# Patient Record
Sex: MALE | URBAN - METROPOLITAN AREA
[De-identification: names, ages, dates, MRNs, and addresses within clinical notes are randomized per-mention and may not be internally consistent; named-entity substitution may affect disease eponyms.]

---

## 2017-01-27 ENCOUNTER — COMMUNICATION - HEALTHEAST (OUTPATIENT)
Dept: SLEEP MEDICINE | Facility: CLINIC | Age: 47
End: 2017-01-27

## 2017-01-27 DIAGNOSIS — G47.00 PERSISTENT INSOMNIA: ICD-10-CM

## 2017-03-23 ENCOUNTER — COMMUNICATION - HEALTHEAST (OUTPATIENT)
Dept: SLEEP MEDICINE | Facility: CLINIC | Age: 47
End: 2017-03-23

## 2017-03-23 DIAGNOSIS — G47.00 PERSISTENT INSOMNIA: ICD-10-CM

## 2017-05-30 ENCOUNTER — COMMUNICATION - HEALTHEAST (OUTPATIENT)
Dept: SLEEP MEDICINE | Facility: CLINIC | Age: 47
End: 2017-05-30

## 2017-05-30 DIAGNOSIS — G47.00 PERSISTENT INSOMNIA: ICD-10-CM

## 2017-07-24 ENCOUNTER — COMMUNICATION - HEALTHEAST (OUTPATIENT)
Dept: SLEEP MEDICINE | Facility: CLINIC | Age: 47
End: 2017-07-24

## 2017-07-24 DIAGNOSIS — G47.00 PERSISTENT INSOMNIA: ICD-10-CM

## 2017-09-13 ENCOUNTER — COMMUNICATION - HEALTHEAST (OUTPATIENT)
Dept: SLEEP MEDICINE | Facility: CLINIC | Age: 47
End: 2017-09-13

## 2017-09-13 DIAGNOSIS — G47.00 PERSISTENT INSOMNIA: ICD-10-CM

## 2017-09-15 ENCOUNTER — COMMUNICATION - HEALTHEAST (OUTPATIENT)
Dept: SLEEP MEDICINE | Facility: CLINIC | Age: 47
End: 2017-09-15

## 2017-09-28 ENCOUNTER — AMBULATORY - HEALTHEAST (OUTPATIENT)
Dept: SLEEP MEDICINE | Facility: CLINIC | Age: 47
End: 2017-09-28

## 2017-11-14 ENCOUNTER — COMMUNICATION - HEALTHEAST (OUTPATIENT)
Dept: SLEEP MEDICINE | Facility: CLINIC | Age: 47
End: 2017-11-14

## 2017-11-14 DIAGNOSIS — G47.00 PERSISTENT INSOMNIA: ICD-10-CM

## 2017-11-27 ENCOUNTER — OFFICE VISIT - HEALTHEAST (OUTPATIENT)
Dept: SLEEP MEDICINE | Facility: CLINIC | Age: 47
End: 2017-11-27

## 2017-11-27 DIAGNOSIS — G47.00 PERSISTENT INSOMNIA: ICD-10-CM

## 2017-11-27 ASSESSMENT — MIFFLIN-ST. JEOR: SCORE: 1624.69

## 2018-06-23 ENCOUNTER — COMMUNICATION - HEALTHEAST (OUTPATIENT)
Dept: SLEEP MEDICINE | Facility: CLINIC | Age: 48
End: 2018-06-23

## 2018-06-23 DIAGNOSIS — G47.00 PERSISTENT INSOMNIA: ICD-10-CM

## 2018-11-26 ENCOUNTER — OFFICE VISIT - HEALTHEAST (OUTPATIENT)
Dept: SLEEP MEDICINE | Facility: CLINIC | Age: 48
End: 2018-11-26

## 2018-11-26 DIAGNOSIS — G47.00 PERSISTENT INSOMNIA: ICD-10-CM

## 2018-11-26 ASSESSMENT — MIFFLIN-ST. JEOR: SCORE: 1602.01

## 2019-05-29 ENCOUNTER — COMMUNICATION - HEALTHEAST (OUTPATIENT)
Dept: SLEEP MEDICINE | Facility: CLINIC | Age: 49
End: 2019-05-29

## 2019-05-29 DIAGNOSIS — G47.00 PERSISTENT INSOMNIA: ICD-10-CM

## 2019-06-19 ENCOUNTER — COMMUNICATION - HEALTHEAST (OUTPATIENT)
Dept: SLEEP MEDICINE | Facility: CLINIC | Age: 49
End: 2019-06-19

## 2019-09-24 ENCOUNTER — OFFICE VISIT - HEALTHEAST (OUTPATIENT)
Dept: SLEEP MEDICINE | Facility: CLINIC | Age: 49
End: 2019-09-24

## 2019-09-24 DIAGNOSIS — G47.00 PERSISTENT INSOMNIA: ICD-10-CM

## 2019-09-24 DIAGNOSIS — G47.8 NON-RESTORATIVE SLEEP: ICD-10-CM

## 2019-09-24 DIAGNOSIS — G47.8 SLEEP DYSFUNCTION WITH SLEEP STAGE DISTURBANCE: ICD-10-CM

## 2019-09-24 ASSESSMENT — MIFFLIN-ST. JEOR: SCORE: 1579.33

## 2019-09-26 ENCOUNTER — RECORDS - HEALTHEAST (OUTPATIENT)
Dept: ADMINISTRATIVE | Facility: OTHER | Age: 49
End: 2019-09-26

## 2019-09-27 ENCOUNTER — AMBULATORY - HEALTHEAST (OUTPATIENT)
Dept: SLEEP MEDICINE | Facility: CLINIC | Age: 49
End: 2019-09-27

## 2019-10-01 ENCOUNTER — COMMUNICATION - HEALTHEAST (OUTPATIENT)
Dept: SLEEP MEDICINE | Facility: CLINIC | Age: 49
End: 2019-10-01

## 2019-10-01 DIAGNOSIS — G47.8 NON-RESTORATIVE SLEEP: ICD-10-CM

## 2019-10-01 DIAGNOSIS — G47.8 SLEEP DYSFUNCTION WITH SLEEP STAGE DISTURBANCE: ICD-10-CM

## 2019-10-01 DIAGNOSIS — G47.34 SLEEP RELATED HYPOXIA: ICD-10-CM

## 2019-11-04 ENCOUNTER — RECORDS - HEALTHEAST (OUTPATIENT)
Dept: SLEEP MEDICINE | Facility: CLINIC | Age: 49
End: 2019-11-04

## 2019-11-04 ENCOUNTER — RECORDS - HEALTHEAST (OUTPATIENT)
Dept: ADMINISTRATIVE | Facility: OTHER | Age: 49
End: 2019-11-04

## 2019-11-04 DIAGNOSIS — G47.8 OTHER SLEEP DISORDERS: ICD-10-CM

## 2019-11-04 DIAGNOSIS — G47.34 IDIOPATHIC SLEEP RELATED NONOBSTRUCTIVE ALVEOLAR HYPOVENTILATION: ICD-10-CM

## 2019-11-12 ENCOUNTER — COMMUNICATION - HEALTHEAST (OUTPATIENT)
Dept: SLEEP MEDICINE | Facility: CLINIC | Age: 49
End: 2019-11-12

## 2019-11-15 ENCOUNTER — COMMUNICATION - HEALTHEAST (OUTPATIENT)
Dept: SLEEP MEDICINE | Facility: CLINIC | Age: 49
End: 2019-11-15

## 2019-11-27 ENCOUNTER — COMMUNICATION - HEALTHEAST (OUTPATIENT)
Dept: SLEEP MEDICINE | Facility: CLINIC | Age: 49
End: 2019-11-27

## 2019-11-27 DIAGNOSIS — G47.00 PERSISTENT INSOMNIA: ICD-10-CM

## 2019-12-13 ENCOUNTER — COMMUNICATION - HEALTHEAST (OUTPATIENT)
Dept: SCHEDULING | Facility: CLINIC | Age: 49
End: 2019-12-13

## 2019-12-16 ENCOUNTER — COMMUNICATION - HEALTHEAST (OUTPATIENT)
Dept: SLEEP MEDICINE | Facility: CLINIC | Age: 49
End: 2019-12-16

## 2020-01-08 ENCOUNTER — COMMUNICATION - HEALTHEAST (OUTPATIENT)
Dept: SLEEP MEDICINE | Facility: CLINIC | Age: 50
End: 2020-01-08

## 2020-01-08 DIAGNOSIS — G47.00 PERSISTENT INSOMNIA: ICD-10-CM

## 2020-01-08 RX ORDER — CLONAZEPAM 0.5 MG/1
TABLET ORAL
Qty: 30 TABLET | Refills: 0 | Status: SHIPPED | OUTPATIENT
Start: 2020-01-08

## 2021-01-28 ENCOUNTER — DOCUMENTATION ONLY (OUTPATIENT)
Dept: SLEEP MEDICINE | Facility: CLINIC | Age: 51
End: 2021-01-28

## 2021-05-29 NOTE — TELEPHONE ENCOUNTER
clonazePAM (KLONOPIN) 1 MG tablet  1 mg, Oral, Bedtime PRN  EditCancel Reorder       Summary: Take 1 tablet (1 mg total) by mouth at bedtime as needed (insomnia)., Starting Mon 11/26/2018, Normal   Dose, Frequency: 1 mg, Bedtime PRN  Start: 11/26/2018  Ord/Sold: 11/26/2018 (O)  Report  Adh:   Taking:   Long-term:   Pharmacy: [facility] #[id number] - [address] Dose History       Patient Sig: Take 1 tablet (1 mg total) by mouth at bedtime as needed (insomnia).       Ordered on: 11/26/2018       Authorized by: MEEK DURAN       Dispense: 60 tablet       Refills: 2 ordered        LOV: 11/26/18

## 2021-05-29 NOTE — TELEPHONE ENCOUNTER
Patricio called my direct extension leaving me a voicemail: he called to ask when he is due for his annual f/u visit. He also was hoping to corollate PCP (physical), and Sleep provider visit.     I called him back today 6/19/2019  at [phone]. No answer. I left him a message letting him know that Dr. Rose is no longer with [facility], and gave him the names of our providers to schedule his November 2019 visit with. I let him know that there are no other providers in our building any longer other then our sleep providers.   I did also let him know that he is on a recall list and someone should be contacting him in August to remind or schedule his f/u appointment.

## 2021-05-31 VITALS — BODY MASS INDEX: 26.07 KG/M2 | WEIGHT: 172 LBS | HEIGHT: 68 IN

## 2021-06-01 NOTE — PROGRESS NOTES
Ordered: Overnight Oximetry Test    Company: [facility] [employer]    Ordering Provider: Gamaliel Nguyen DO    Date: 9/24/2019    Fax: [phone]

## 2021-06-01 NOTE — PROGRESS NOTES
Dear Grecia Hastings Pa-c  [address] [place] [id number]    Thank you for the opportunity to participate in the care of Mr. Patricio Jones.    He is a 48 y.o. male who comes to the clinic for the transfer of care of his sleep maintenance insomnia.  Patient states that he has been suffering from sleep maintenance insomnia for more than 10 years.  Prior to arrival to the sleep clinic 6 years ago, the patient was on a multitude of different sedative-hypnotics including Lunesta, as well as Ambien.  All of which may have worked a little bit but over time, he develops tolerance to the medications.  He has been prescribed clonazepam 1 mg p.o. nightly by the previous sleep providers for more than 3 years.  At this point he feels that his sleep quality is much better compared to before.  There is some concern about dependence this medication and possible increased risk of morbidity/mortality associated with long-term benzodiazepine use.  The patient denies any episodes of witnessed apnea and snoring, he admits that he is kind of a restless sleeper and will frequently toss and turn throughout the night.  The patient's review of systems is otherwise negative.     Ideal Sleep-Wake Cycle(devoid of societal pressure):    Patient would try to initiate sleep at around 11PM with a sleep latency of less than 15 minutes. The patient would have 1 awakening. Final wake up time is around 7AM.    Past Medical History  No past medical history on file.     Past Surgical History  No past surgical history on file.     Meds  Current Outpatient Medications   Medication Sig Dispense Refill     clonazePAM (KLONOPIN) 1 MG tablet Take 1 tablet (1 mg total) by mouth at bedtime as needed (insomnia). 30 tablet 5     No current facility-administered medications for this visit.         Allergies  Patient has no known allergies.     Social History  Social History     Socioeconomic History     Marital status: Single     Spouse name:  Not on file     Number of children: Not on file     Years of education: Not on file     Highest education level: Not on file   Occupational History     Not on file   Social Needs     Financial resource strain: Not on file     Food insecurity:     Worry: Not on file     Inability: Not on file     Transportation needs:     Medical: Not on file     Non-medical: Not on file   Tobacco Use     Smoking status: Never Smoker     Smokeless tobacco: Never Used   Substance and Sexual Activity     Alcohol use: Not on file     Drug use: Not on file     Sexual activity: Not on file   Lifestyle     Physical activity:     Days per week: Not on file     Minutes per session: Not on file     Stress: Not on file   Relationships     Social connections:     Talks on phone: Not on file     Gets together: Not on file     Attends [employer] service: Not on file     Active member of club or organization: Not on file     Attends meetings of clubs or organizations: Not on file     Relationship status: Not on file     Intimate partner violence:     Fear of current or ex partner: Not on file     Emotionally abused: Not on file     Physically abused: Not on file     Forced sexual activity: Not on file   Other Topics Concern     Not on file   Social History Narrative     Not on file        Family History  No family history on file.     Review of Systems:  Constitutional: Negative except as noted in HPI.   Eyes: Negative except as noted in HPI.   ENT: Negative except as noted in HPI.   Cardiovascular: Negative except as noted in HPI.   Respiratory: Negative except as noted in HPI.   Gastrointestinal: Negative except as noted in HPI.   Genitourinary: Negative except as noted in HPI.   Musculoskeletal: Negative except as noted in HPI.   Integumentary: Negative except as noted in HPI.   Neurological: Negative except as noted in HPI.   Psychiatric: Negative except as noted in HPI.   Endocrine: Negative except as noted in HPI.   Hematologic/Lymphatic:  "Negative except as noted in HPI.      No flowsheet data found.  Epworths Sleepiness Scale 8/15/2016 11/27/2017 9/24/2019   Sitting and reading 1 1 1   Watching TV 1 1 1   Sitting, inactive in a public place (e.g. a theatre or a meeting) 0 0 0   As a passenger in a car for an hour without a break 0 0 0   Lying down to rest in the afternoon when circumstances permit 3 2 2   Sitting and talking to someone 0 0 0   Sitting quietly after a lunch without alcohol 0 0 0   In a car, while stopped for a few minutes in traffic 0 0 0   Total score 5 4 4     No flowsheet data found.    Physical Exam:  /74   Pulse 78   Ht 5' 8\" (1.727 m)   Wt 162 lb (73.5 kg)   SpO2 95%   BMI 24.63 kg/m    BMI:Body mass index is 24.63 kg/m .   GEN: NAD, appropriate for age  Head: Normocephalic.  EYES: PERRLA, EOMI  ENT: Oropharynx is clear, Em class 3+ airway. Uvula is intact  Nasal mucosa is moist without erythema  Neck : Thyroid is within normal limits.  CV: Regular rate and rhythm, S1 & S2 positive.  LUNGS: Bilateral breathsounds heard.   ABDOMEN: Positive bowel sounds in all quadrants, soft, no rebound or guarding  MUSCULOSKELETAL: Bilateral trace leg swelling  SKIN: warm, dry, no rashes  Neurological: Alert, oriented to time, place, and person.  Psych: normal mood, normal affect     Labs/Studies:     No results found for: WBC, HGB, HCT, MCV, PLT      Chemistry    No results found for: NA, K, CL, CO2, BUN, CREATININE, GLU No results found for: CALCIUM, ALKPHOS, AST, ALT, BILITOT         No results found for: FERRITIN  No results found for: TSH      Assessment and Plan:  In summary Patricio Jones is a 48 y.o. year old male here for transfer of care with.  1.  Sleep Maintenance Insomnia  Had a discussion with the patient about the adverse side effects associated with clonazepam.  I informed the patient that it is my intent to slowly wean him off this medication while instituting correct cognitive behavioral therapy treatment " options in its place.  We then spent a good deal time discussing the different techniques of cognitive repair with therapy including stimulus control, sleep restriction, paradoxical intention and decatastrophacation of sleep.  I would like him to also fill out a 6-week sleep log documenting his sleep-wake cycle.  Once we have the pillars of cognitive behavioral therapy in place then we may slowly wean him off of the medication over the course of 6 months.  I will give the patient handout on stimulus control.  2.  Other sleep disturbance/nonrestorative sleep  I will also order a nocturnal oximetry to look for abnormalities.  If positive, the patient may proceed with an in lab sleep study.    Patient should have the nocturnal oximetry and sleep logs before his next clinical follow-up with me.    Patient verbalized understanding of these issues, agrees with the plan and all questions were answered today. Patient was given an opportuntity to voice any other symptoms or concerns not listed above. Patient did not have any other symptoms or concerns.        Gamaliel Nguyen DO  Board Certified in Internal Medicine and Sleep Medicine  [facility].    (Note created with Dragon voice recognition and unintended spelling errors and word substitutions may occur)

## 2021-06-01 NOTE — PROGRESS NOTES
OVERNIGHT OXIMETRY RESULTS RECEIVED TODAY: 9/27/2019    FROM: ELSA    THEY HAVE BEEN SENT TO SCAN INTO CHART, AND A COPY GIVEN TO DR. MCCURDY  FOR REVIEW

## 2021-06-01 NOTE — TELEPHONE ENCOUNTER
We were able to obtain the patient's overnight nocturnal oximetry study which was performed on 09/26/2019.  The patient's basal SaO2 was 91.37%.  The patient's lowest O2 sat was 80%.  There were episodes of cyclical desaturation associated with heart rate variability. There was also a significant amount of artifact.    This is an abnormal nocturnal oximetry study.      I was able to speak with the patient to review the results. He is open to getting the sleep study. We decided not to wean off any medications until after the sleep study is performed. Please call the patient to schedule for the sleep study. Orders are in the chart. Thank you.

## 2021-06-02 VITALS — WEIGHT: 167 LBS | HEIGHT: 68 IN | BODY MASS INDEX: 25.31 KG/M2

## 2021-06-03 VITALS
HEART RATE: 78 BPM | HEIGHT: 68 IN | WEIGHT: 162 LBS | SYSTOLIC BLOOD PRESSURE: 110 MMHG | DIASTOLIC BLOOD PRESSURE: 74 MMHG | BODY MASS INDEX: 24.55 KG/M2 | OXYGEN SATURATION: 95 %

## 2021-06-03 NOTE — TELEPHONE ENCOUNTER
Please mail a letter with a copy of the patient's sleep study encouraging the patient to call us to schedule a follow up visit to review the results in person. Thank you.

## 2021-06-03 NOTE — TELEPHONE ENCOUNTER
Patricio called today 11/15/2019. He would like to cancel his follow up visit 11/25/19. He would like me to mail him a copy of the sleep study report. I verified home address. He also mentioned that he is slowly weaning off of his current medication by 10% per week. He will call us to schedule an appointment if his symptoms worsen in the future.

## 2021-06-03 NOTE — TELEPHONE ENCOUNTER
Medication Request  Medication name: Clonazepam 0.5 mg tablet, one tablet at bedtime.  Quantity #20.    Pharmacy Name and Location: [facility] #[id number]  Reason for request: Patient is weaning himself off clonazepam 1 mg since 11/5/19. He is currently down to taking 1/2 tablet at bedtime..  He is asking for a prescription for clonazepam 0.5 mg.  He intends to wean completely off this medication by end of the year.  He will do 1/2 tablet until he gets 2 consistent nights of good sleep (possible one week), then 1/4 tablet until he weans off.  Dr. Nguyen has prescribed this medication for him.  When did you use medication last?:  today  Patient offered appointment:  recently seen  Okay to leave a detailed message: yes

## 2021-06-04 NOTE — TELEPHONE ENCOUNTER
RN triage   Call from pt   Pt expressed frustration and discontent w/ the 3 sleep specialists he has seen  - pt does not want to see another sleep specialist   Pt has long hx of falling asleep initially - sleeping form 2300- 0100 -- then waking and not falling back to sleep for hours-- and then intermittent sleep the rest of the night  Pt has been taking klonopin for years --and pt states klonopin worked well for him    then pt  told to go off medication due to side effects -- pt weaned himself off gradually over 6 weeks-- this is the first week of no klonopin  Pt wants to know if OK to take OTC sleep medications occasionally?  Reviewed home care advice  Per protocol = should see PCP   Pt states he will call PCP --   Alana Kee RN [facility] RN triage      Reason for Disposition    Insomnia is an ongoing problem (> 2 weeks)    Protocols used: INSOMNIA-A-OH

## 2021-06-04 NOTE — TELEPHONE ENCOUNTER
Called the patient at [phone], no answer, left a brief message for the patient to call back.    Belinda Go CMA 12/16/2019 4:07 PM

## 2021-06-04 NOTE — TELEPHONE ENCOUNTER
Patricio called today to get advise about weaning off his medication. He has been each week cutting down by half. No side effects until he cut down to 1/8 tablet. He would like Dr. Pope advise on best way to wean off medication.  Would like to know how long it should take to wean off medication that he has been taking for 6 years. (Clonazepam)

## 2021-06-04 NOTE — TELEPHONE ENCOUNTER
I recommend that the patient take the 1/8 tab every other day for two weeks and then take one tab every 2 days for 1 week and then stop. If the symptoms return. He can always go back to 1/8 tab every 2 days. Thanks.

## 2021-06-04 NOTE — TELEPHONE ENCOUNTER
Patient Returning Call  Reason for call:  Returning clinic call  Information relayed to patient:   Read back to the patient the message by Dr Nguyen.  The patient is understanding of the plan to take 1/8 tab every other day for 14 days then 1/8 tab every 2 days for 1 week,t week, then stop, Understands if problems at the end of this can go back to 1/8th tab every 2 days.  The patient states that he has enough of the tablets to get through the end of the year, will call only if finding needs to continue after that.    Patient has additional questions:  No  If YES, what are your questions/concerns:  NA  Okay to leave a detailed message?: No call back needed

## 2021-06-13 NOTE — PROGRESS NOTES
Patricio called in on 9/27/17 asking about a supplement from [employer] that his chiropractor had recommended for him to take to help with sleep. He was wanting to know if it was ok to take this with his Rx Clonazepam. Dr. Rose let him know that he could not advise on this.

## 2021-06-14 NOTE — PROGRESS NOTES
Dear Grecia Lezama PA-C  [address] [place] [id number],    Thank you for the opportunity to participate in the care of Patricio Jones.     He is a 47 y.o. y/o male patient who comes to the sleep medicine clinic for his yearly follow up.    Patricio has a long history of insomnia that is treated with clonazepam 1 mg PO every night. During this year, he attempted to decrease the dose of this medication but he noticed that his sleep quality was too poor and continued taking the 1 mg dose.     He is concerned about the side effects of clonazepam and he has started working on his balance.     He is going to bed around 10 pm and it takes him around 60 minutes to fall asleep. He is able to get 7 to 8 hours of sleep every night. He is waking up around 6:30 AM without the need to use an alarm. He denies any daytime sleepiness. No memory problems.     ESS: 4    Past medical history:   Insomnia      Social History     Social History     Marital status: Single     Spouse name: N/A     Number of children: N/A     Years of education: N/A     Occupational History     Not on file.     Social History Main Topics     Smoking status: Never Smoker     Smokeless tobacco: Not on file     Alcohol use Not on file     Drug use: Not on file     Sexual activity: Not on file     Other Topics Concern     Not on file     Social History Narrative         Review of Systems:  General: No weight gain, no weight loss  Eyes: No vision changes  ENT: No hearing changes  Cardio: No chest pain, no nocturnal dyspnea  Respiratory: No shortness of breath, no cough  Gastrointestinal: No diarrhea, no constipation  Genitourinary: No excessive urination  Tegumentary: No rashes  Neurological: No seizures, no loss of consciousness  Endo: No heat or cold intolerance.    Current Outpatient Prescriptions   Medication Sig Dispense Refill     clonazePAM (KLONOPIN) 1 MG tablet TAKE ONE TABLET BY MOUTH AT BEDTIME AS NEEDED  60 tablet 0     melatonin 1 mg  "Tab tablet Take 3 mg by mouth bedtime as needed.       No current facility-administered medications for this visit.        No Known Allergies    Physical Exam:  /90  Pulse 63  Ht 5' 8\" (1.727 m)  Wt 172 lb (78 kg)  SpO2 97%  BMI 26.15 kg/m2  BMI:Body mass index is 26.15 kg/(m^2).   GEN: NAD.  Neurological: Alert, oriented to time, place, and person.  Psych: normal mood, normal affect     Assessment and Plan:  In summary Patricio Jones is a 47 y.o. year old male here for follow up.    1. Chronic insomnia.  This problem is well controlled with Clonazepam 1 mg at bedtime.  We reviewed safety measures to avoid falls while he is taking clonazepam.  He is very knowledgeable regarding his insomnia.     Patient verbalized understanding of these issues, agrees with the plan and all questions were answered today. Patient was given an opportuntity to voice any other symptoms or concerns not listed above. Patient did not have any other symptoms or concerns.      Patient told to return in 12 months.     MD CARMELITA Calhoun Board Certified in Internal Medicine and Sleep Medicine  [facility].    "

## 2021-06-17 NOTE — PATIENT INSTRUCTIONS - HE
"Patient Instructions by Gamaliel Nguyen DO at 9/24/2019  9:00 AM     Author: Gamaliel Nguyen DO Service: -- Author Type: Physician    Filed: 9/24/2019  9:54 AM Encounter Date: 9/24/2019 Status: Addendum    : Gamaliel Nguyen DO (Physician)    Related Notes: Original Note by Gamaliel Nguyen DO (Physician) filed at 9/24/2019  9:36 AM       Equipment Instructions    Please contact [employer] below to inquire on how to obtain the nocturnal oximetry device. Use the device for one night and then bring it back to [place]. They will give me a copy of the results. I will then call you to inform you what the next step of the process will be.    Contact information for [employer] company:    [employer] Tel: [phone]      Stimulus control    Stimulus control therapy is based on the idea that some people with poor sleep habits have learned to associate the bed with staying awake rather than sleeping.  ?You should spend no more than 15 minutes lying in bed trying to fall asleep.  ?If you cannot fall asleep within 15 minutes, bring a chair and put it next to your bed and sit in that chair. Try to close your eyes in the dark and focus on your breathing. Activities such as eating, balancing your checkbook, doing housework, or studying for a test, which \"reward\" you for staying awake, should be avoided.  ?When you start to feel sleepy, you can return to bed. If you cannot fall asleep in another 20 minutes, repeat the process.    Please fill out sleep log       "

## 2021-06-19 NOTE — LETTER
Letter by Gamaliel Nguyen DO at      Author: Gamaliel Nguyen DO Service: -- Author Type: --    Filed:  Encounter Date: 11/15/2019 Status: Signed       Patricio Peraltadanielle  [address] [facility] [address]  [facility] [address]       November 15, 2019      Dear Mr. Jones,      Enclosed is the report from your recent sleep study.     Please call [phone] to schedule follow up visit if you would like to review these results in person with Dr. Nguyen. We are scheduling out approximately 2-3 months. If you would like to be seen sooner, please ask to be added to our wait/cancellation list.           Sincerely,      Anne GOMEZ [facility]  Sleep Medicine  11/15/2019 11:16 AM    C/O      Gamaliel Nguyen DO

## 2021-06-19 NOTE — LETTER
Letter by Gamaliel Nguyen DO at      Author: Gamaliel Nguyen DO Service: -- Author Type: --    Filed:  Encounter Date: 11/12/2019 Status: Signed         Patricio Jones  [address] [facility] [address]  [facility] [address]      November 13, 2019      Dear Mr. Jones,    Enclosed is your recent sleep study. Please call our office to schedule an appointment with Dr. Nguyen to discuss results.    Thank you,  Belinda GRANGER, CMA

## 2021-06-21 NOTE — PROGRESS NOTES
Dear  Grecia Lezama Pa-c  [address] [place] [id number]    Thank you for the opportunity to participate in the care of  Patricio Jones.    He is a 48 y.o. y/o who comes to the clinic for a follow up visit regarding his insomnia.    Mr. Jones has a long history of insomnia that has been well controlled with clonazepam 1 mg PO every night. He is overalls satisfied with the quality of his sleep.    He has been going to bed around 11 pm and he takes clonazepam when he is in bed. He is falling asleep in less than 15 minutes. He is able to stay asleep for most of the night without difficulties. He wakes up around 7 AM without difficulties.    He denies any sleepiness in the morning. No recent falls.     He continues to practice sleep hygiene and avoid drinking coffee before 10 AM. He exercises daily.     Past Medical History  Insomnia.     Past Surgical History  No past surgical history on file.     Meds  Current Outpatient Medications   Medication Sig Dispense Refill     clonazePAM (KLONOPIN) 1 MG tablet TAKE ONE TABLET BY MOUTH AT BEDTIME AS NEEDED for anxiety 60 tablet 4     No current facility-administered medications for this visit.         Allergies  Patient has no known allergies.     Social History  Social History     Socioeconomic History     Marital status: Single     Spouse name: Not on file     Number of children: Not on file     Years of education: Not on file     Highest education level: Not on file   Social Needs     Financial resource strain: Not on file     Food insecurity - worry: Not on file     Food insecurity - inability: Not on file     Transportation needs - medical: Not on file     Transportation needs - non-medical: Not on file   Occupational History     Not on file   Tobacco Use     Smoking status: Never Smoker   Substance and Sexual Activity     Alcohol use: Not on file     Drug use: Not on file     Sexual activity: Not on file   Other Topics Concern     Not on file   Social  "History Narrative     Not on file          Review of Systems:  Constitutional: Negative except as noted in HPI.   Eyes: Negative except as noted in HPI.   ENT: Negative except as noted in HPI.   Cardiovascular: Negative except as noted in HPI.   Respiratory: Negative except as noted in HPI.   Gastrointestinal: Negative except as noted in HPI.   Genitourinary: Negative except as noted in HPI.   Musculoskeletal: Negative except as noted in HPI.   Integumentary: Negative except as noted in HPI.   Neurological: Negative except as noted in HPI.   Psychiatric: Negative except as noted in HPI.   Endocrine: Negative except as noted in HPI.   Hematologic/Lymphatic: Negative except as noted in HPI.      Physical Exam:  Pulse (!) 56   Ht 5' 8\" (1.727 m)   Wt 167 lb (75.8 kg)   SpO2 99%   BMI 25.39 kg/m    BMI:Body mass index is 25.39 kg/m .   GEN: NAD.  Neurological: Alert, oriented to time, place, and person.  Psych:  normal mood, normal affect         Assessment and Plan:  In summary Patricio Jones is a 48 y.o. year old male here for follow up.    1. Chronic Insomnia.  This is well controlled with clonazepam 1 mg   I recommend continuing clonazepam 1 mg PO every night.  He is not having any side effects.     Patient verbalized understanding of these issues, agrees with the plan and all questions were answered today. Patient was given an opportuntity to voice any other symptoms or concerns not listed above. Patient did not have any other symptoms or concerns.      Patient told to return in 12 months. I explained to the patient that he may need a follow up appointment sooner than that after I transfer to a new job.    I explained to the patient that I will be transitioning to a different job soon. I reassured him that this transition should not cause any significant disruptions to his care. I provided some information on the process and encouraged him to contact our main number if he has any questions or needs any " help.     MD CARMELITA Calhoun Board Certified in Internal Medicine and Sleep Medicine  [facility].